# Patient Record
Sex: MALE | Race: BLACK OR AFRICAN AMERICAN | NOT HISPANIC OR LATINO | Employment: FULL TIME | URBAN - NONMETROPOLITAN AREA
[De-identification: names, ages, dates, MRNs, and addresses within clinical notes are randomized per-mention and may not be internally consistent; named-entity substitution may affect disease eponyms.]

---

## 2019-04-30 ENCOUNTER — TRANSCRIBE ORDERS (OUTPATIENT)
Dept: PODIATRY | Facility: CLINIC | Age: 34
End: 2019-04-30

## 2019-04-30 DIAGNOSIS — S92.125A NONDISPLACED FRACTURE OF BODY OF LEFT TALUS, INITIAL ENCOUNTER FOR CLOSED FRACTURE: Primary | ICD-10-CM

## 2019-05-06 ENCOUNTER — OFFICE VISIT (OUTPATIENT)
Dept: PODIATRY | Facility: CLINIC | Age: 34
End: 2019-05-06

## 2019-05-06 VITALS — OXYGEN SATURATION: 99 % | HEIGHT: 69 IN | HEART RATE: 98 BPM | BODY MASS INDEX: 35.52 KG/M2 | WEIGHT: 239.8 LBS

## 2019-05-06 DIAGNOSIS — S99.912A INJURY OF LEFT ANKLE, INITIAL ENCOUNTER: Primary | ICD-10-CM

## 2019-05-06 PROCEDURE — 99203 OFFICE O/P NEW LOW 30 MIN: CPT | Performed by: PODIATRIST

## 2019-05-06 NOTE — PROGRESS NOTES
Jm Black  1985  33 y.o. male     Patient presents today from [facility] with a complaint of left foot/ankle pain.    05/06/2019    Chief Complaint   Patient presents with   • Left Ankle - Pain       History of Present Illness    Jm Black is a 33 y.o.male who presents to clinic today with chief complaint of left ankle injury.  Patient states that on April 28 he was walking in his mother's kitchen when he fell in a hole.  The fall was approximately 3 and half feet.  He injured his left ankle in the process.  It was very swollen and painful at the time.  Patient was seen in the urgent care where x-rays were taken.  He was placed into a boot and given follow-up.  He presents to clinic today for the follow-up.  He is ambulating in a boot.  He states that his pain has significantly improved.  However, he still has pain to the left ankle with weightbearing rated as a 5 out of 10.  He has no other pedal complaints today.      Past Medical History:   Diagnosis Date   • Cancer (CMS/[facility] [place])          Past Surgical History:   Procedure Laterality Date   • EYE SURGERY           History reviewed. No pertinent family history.    No Known Allergies    Social History     Socioeconomic History   • Marital status: Single     Spouse name: Not on file   • Number of children: Not on file   • Years of education: Not on file   • Highest education level: Not on file   Tobacco Use   • Smoking status: Current Every Day Smoker     Types: Cigarettes   • Smokeless tobacco: Never Used   Substance and Sexual Activity   • Alcohol use: No     Frequency: Never   • Drug use: Defer   • Sexual activity: Defer         No current outpatient medications on file.     No current facility-administered medications for this visit.        Review of Systems   Constitutional: Negative.    HENT: Negative.    Eyes: Negative.    Respiratory: Negative.    Cardiovascular: Negative.    Gastrointestinal: Negative.    Endocrine: Negative.   "  Genitourinary: Negative.    Musculoskeletal:        Left foot/ankle pain   Skin: Negative.    Neurological: Negative.    Psychiatric/Behavioral: Negative.          OBJECTIVE    Pulse 98   Ht 175.3 cm (69\")   Wt 109 kg (239 lb 12.8 oz)   SpO2 99%   BMI 35.41 kg/m²       Physical Exam   Constitutional: He is oriented to person, place, and time. He appears well-developed and well-nourished. No distress.   HENT:   Head: Normocephalic and atraumatic.   Nose: Nose normal.   Eyes: Conjunctivae and EOM are normal. Pupils are equal, round, and reactive to light.   Pulmonary/Chest: Effort normal. No respiratory distress. He has no wheezes.   Neurological: He is alert and oriented to person, place, and time. He displays normal reflexes.   Skin: Skin is warm and dry. Capillary refill takes less than 2 seconds. He is not diaphoretic.   Psychiatric: He has a normal mood and affect. His behavior is normal.   Vitals reviewed.      Gait: Antalgic    Assistive Device: Cam boot    Left lower Extremity    Cardiovascular:    DP/PT pulses palpable   CFT brisk  to all digits  Skin temp is warm to warm from proximal tibia to distal digits   Pedal hair growth present.   Minimal leeroy-malleolar edema    Musculoskeletal:  Muscle strength is 5/5 for all muscle groups tested   ROM of the 1st MTP is WNL    ROM of the MTJ is WNL    ROM of the STJ is WNL    ROM of the ankle joint is  WNL with slight pain  Pain on palpation to the region of the ATFL and CFL  Pain on palpation to the medial deltoid    Dermatological:   Skin is warm, dry and intact    Webspaces 1-4  are clean, dry and intact.   No subcutaneous nodules or masses noted      Neurological:   Protective sensation intact   Sensation intact to light touch        Procedures        ASSESSMENT AND PLAN    Jm was seen today for pain.    Diagnoses and all orders for this visit:    Injury of left ankle, initial encounter      - Comprehensive foot and ankle exam performed.   -X-rays " reviewed  -Continue weightbearing in cam boot.  Ice and anti-inflammatories as needed.  - All questions were answered to the patients satisfaction.  - RTC 2 weeks, repeat radiographs          This document has been electronically signed by Adolfo Sotelo DPM on May 6, 2019 11:18 AM     5/6/2019  11:18 AM

## 2019-05-21 ENCOUNTER — OFFICE VISIT (OUTPATIENT)
Dept: PODIATRY | Facility: CLINIC | Age: 34
End: 2019-05-21

## 2019-05-21 VITALS — BODY MASS INDEX: 35.4 KG/M2 | OXYGEN SATURATION: 98 % | HEART RATE: 91 BPM | WEIGHT: 239 LBS | HEIGHT: 69 IN

## 2019-05-21 DIAGNOSIS — S99.912D INJURY OF ANKLE, LEFT, SUBSEQUENT ENCOUNTER: Primary | ICD-10-CM

## 2019-05-21 PROCEDURE — 99213 OFFICE O/P EST LOW 20 MIN: CPT | Performed by: PODIATRIST

## 2019-05-21 NOTE — PROGRESS NOTES
"Jm Hoangjosue  1985  34 y.o. male     Patient presents today for a recheck of his left ankle with repeat xrays.    05/21/2019     Chief Complaint   Patient presents with   • Left Ankle - Follow-up       History of Present Illness    Patient presents to clinic today for follow-up of his left ankle sprain.  He is currently ambulating in regular shoe gear without pain.  He states that he discontinue the use of his cam boot proximally 3 days ago.  He denies any new complaints.    Past Medical History:   Diagnosis Date   • Cancer (CMS/HCC)    • Left ankle pain          Past Surgical History:   Procedure Laterality Date   • EYE SURGERY           History reviewed. No pertinent family history.    No Known Allergies    Social History     Socioeconomic History   • Marital status: Single     Spouse name: Not on file   • Number of children: Not on file   • Years of education: Not on file   • Highest education level: Not on file   Tobacco Use   • Smoking status: Current Every Day Smoker     Types: Cigarettes   • Smokeless tobacco: Never Used   Substance and Sexual Activity   • Alcohol use: No     Frequency: Never   • Drug use: Defer   • Sexual activity: Defer         No current outpatient medications on file.     No current facility-administered medications for this visit.        Review of Systems   Constitutional: Negative.    HENT: Negative.    Eyes: Negative.    Respiratory: Negative.    Cardiovascular: Negative.    Gastrointestinal: Negative.    Musculoskeletal:        Left foot/ankle pain   Skin: Negative.    Psychiatric/Behavioral: Negative.          OBJECTIVE    Pulse 91   Ht 175.3 cm (69\")   Wt 108 kg (239 lb)   SpO2 98%   BMI 35.29 kg/m²       Physical Exam   Constitutional: He is oriented to person, place, and time. He appears well-developed and well-nourished. No distress.   HENT:   Head: Normocephalic and atraumatic.   Eyes: Conjunctivae and EOM are normal. Pupils are equal, round, and reactive to light. "   Pulmonary/Chest: Effort normal. No respiratory distress. He has no wheezes.   Neurological: He is alert and oriented to person, place, and time.   Skin: Skin is warm and dry. Capillary refill takes less than 2 seconds.   Psychiatric: He has a normal mood and affect. His behavior is normal.   Vitals reviewed.      Gait: Normal    Assistive Device:  none    Left lower Extremity    Cardiovascular:    DP/PT pulses palpable   CFT brisk  to all digits  Skin temp is warm to warm from proximal tibia to distal digits   No edema    Musculoskeletal:  Muscle strength is 5/5 for all muscle groups tested   ROM of the 1st MTP is WNL    Slight pain on palpation to the region of the ATFL and CFL  No pain on palpation to the medial deltoid    Dermatological:   Webspaces 1-4  are clean, dry and intact.   No subcutaneous nodules or masses noted      Neurological:   Protective sensation intact   Sensation intact to light touch        Procedures        ASSESSMENT AND PLAN    Jm was seen today for follow-up.    Diagnoses and all orders for this visit:    Injury of ankle, left, subsequent encounter  -     XR Ankle 3+ View Left      -Left ankle is improving.  -Dispensed lace up ankle brace.  Rx for physical therapy.  - All questions were answered to the patients satisfaction.  - RTC 6 weeks          This document has been electronically signed by Adolfo Sotelo DPM on May 21, 2019 2:35 PM     5/21/2019  2:35 PM

## 2019-05-29 ENCOUNTER — TRANSCRIBE ORDERS (OUTPATIENT)
Dept: PODIATRY | Facility: CLINIC | Age: 34
End: 2019-05-29

## 2019-05-29 DIAGNOSIS — S99.912D ANKLE INJURY, LEFT, SUBSEQUENT ENCOUNTER: Primary | ICD-10-CM

## 2019-06-04 ENCOUNTER — APPOINTMENT (OUTPATIENT)
Dept: PHYSICAL THERAPY | Facility: HOSPITAL | Age: 34
End: 2019-06-04